# Patient Record
Sex: FEMALE | Race: WHITE | Employment: FULL TIME | URBAN - METROPOLITAN AREA
[De-identification: names, ages, dates, MRNs, and addresses within clinical notes are randomized per-mention and may not be internally consistent; named-entity substitution may affect disease eponyms.]

---

## 2021-04-27 ENCOUNTER — HOSPITAL ENCOUNTER (OUTPATIENT)
Dept: ULTRASOUND IMAGING | Age: 21
Discharge: HOME | End: 2021-04-27
Payer: COMMERCIAL

## 2021-04-27 DIAGNOSIS — R10.2 PELVIC PAIN IN FEMALE: ICD-10-CM

## 2021-04-27 PROCEDURE — 76830 TRANSVAGINAL US NON-OB: CPT

## 2021-04-27 PROCEDURE — 76856 US EXAM PELVIC COMPLETE: CPT

## 2021-04-28 ENCOUNTER — HOSPITAL ENCOUNTER (INPATIENT)
Age: 21
LOS: 1 days | Discharge: HOME | DRG: 690 | End: 2021-04-29
Attending: EMERGENCY MEDICINE | Admitting: INTERNAL MEDICINE
Payer: COMMERCIAL

## 2021-04-28 ENCOUNTER — APPOINTMENT (OUTPATIENT)
Dept: GENERAL RADIOLOGY | Age: 21
DRG: 690 | End: 2021-04-28
Payer: COMMERCIAL

## 2021-04-28 ENCOUNTER — APPOINTMENT (OUTPATIENT)
Dept: CT IMAGING | Age: 21
DRG: 690 | End: 2021-04-28
Payer: COMMERCIAL

## 2021-04-28 DIAGNOSIS — N12 PYELONEPHRITIS: Primary | ICD-10-CM

## 2021-04-28 PROBLEM — N10 ACUTE PYELONEPHRITIS: Status: ACTIVE | Noted: 2021-04-28

## 2021-04-28 LAB
ALBUMIN SERPL-MCNC: 3.7 G/DL (ref 3.4–5)
ALP BLD-CCNC: 74 U/L (ref 40–129)
ALT SERPL-CCNC: 6 U/L (ref 10–40)
ANION GAP SERPL CALCULATED.3IONS-SCNC: 13 MMOL/L (ref 3–16)
AST SERPL-CCNC: 15 U/L (ref 15–37)
BACTERIA WET PREP: NORMAL
BACTERIA: ABNORMAL /HPF
BASOPHILS ABSOLUTE: 0 K/UL (ref 0–0.2)
BASOPHILS RELATIVE PERCENT: 0 %
BILIRUB SERPL-MCNC: 0.6 MG/DL (ref 0–1)
BILIRUBIN DIRECT: <0.2 MG/DL (ref 0–0.3)
BILIRUBIN URINE: NEGATIVE
BILIRUBIN, INDIRECT: ABNORMAL MG/DL (ref 0–1)
BLOOD, URINE: ABNORMAL
BUN BLDV-MCNC: 8 MG/DL (ref 7–20)
CALCIUM SERPL-MCNC: 9 MG/DL (ref 8.3–10.6)
CHLORIDE BLD-SCNC: 100 MMOL/L (ref 99–110)
CLARITY: CLEAR
CLUE CELLS: NORMAL
CO2: 21 MMOL/L (ref 21–32)
COLOR: YELLOW
CREAT SERPL-MCNC: 0.7 MG/DL (ref 0.6–1.1)
EOSINOPHILS ABSOLUTE: 0 K/UL (ref 0–0.6)
EOSINOPHILS RELATIVE PERCENT: 0 %
EPITHELIAL CELLS WET PREP: NORMAL
EPITHELIAL CELLS, UA: ABNORMAL /HPF (ref 0–5)
GFR AFRICAN AMERICAN: >60
GFR NON-AFRICAN AMERICAN: >60
GLUCOSE BLD-MCNC: 149 MG/DL (ref 70–99)
GLUCOSE URINE: NEGATIVE MG/DL
HCG(URINE) PREGNANCY TEST: NEGATIVE
HCT VFR BLD CALC: 35.3 % (ref 36–48)
HEMOGLOBIN: 12 G/DL (ref 12–16)
KETONES, URINE: NEGATIVE MG/DL
LEUKOCYTE ESTERASE, URINE: ABNORMAL
LIPASE: 11 U/L (ref 13–60)
LYMPHOCYTES ABSOLUTE: 0.8 K/UL (ref 1–5.1)
LYMPHOCYTES RELATIVE PERCENT: 4 %
MAGNESIUM: 1.7 MG/DL (ref 1.8–2.4)
MCH RBC QN AUTO: 29.6 PG (ref 26–34)
MCHC RBC AUTO-ENTMCNC: 34.1 G/DL (ref 31–36)
MCV RBC AUTO: 87 FL (ref 80–100)
MICROSCOPIC EXAMINATION: YES
MONOCYTES ABSOLUTE: 0.9 K/UL (ref 0–1.3)
MONOCYTES RELATIVE PERCENT: 5 %
NEUTROPHILS ABSOLUTE: 17.2 K/UL (ref 1.7–7.7)
NEUTROPHILS RELATIVE PERCENT: 91 %
NITRITE, URINE: NEGATIVE
OVALOCYTES: ABNORMAL
PDW BLD-RTO: 14.9 % (ref 12.4–15.4)
PH UA: 6.5 (ref 5–8)
PLATELET # BLD: 238 K/UL (ref 135–450)
PMV BLD AUTO: 9.5 FL (ref 5–10.5)
POTASSIUM REFLEX MAGNESIUM: 3.3 MMOL/L (ref 3.5–5.1)
PROTEIN UA: ABNORMAL MG/DL
RAPID INFLUENZA  B AGN: NEGATIVE
RAPID INFLUENZA A AGN: NEGATIVE
RBC # BLD: 4.05 M/UL (ref 4–5.2)
RBC UA: ABNORMAL /HPF (ref 0–4)
RBC WET PREP: NORMAL
SODIUM BLD-SCNC: 134 MMOL/L (ref 136–145)
SOURCE WET PREP: NORMAL
SPECIFIC GRAVITY UA: <=1.005 (ref 1–1.03)
TOTAL PROTEIN: 7.3 G/DL (ref 6.4–8.2)
TRICHOMONAS PREP: NORMAL
URINE TYPE: ABNORMAL
UROBILINOGEN, URINE: 0.2 E.U./DL
WBC # BLD: 18.9 K/UL (ref 4–11)
WBC UA: ABNORMAL /HPF (ref 0–5)
WBC WET PREP: NORMAL
YEAST WET PREP: NORMAL

## 2021-04-28 PROCEDURE — 96361 HYDRATE IV INFUSION ADD-ON: CPT

## 2021-04-28 PROCEDURE — 87591 N.GONORRHOEAE DNA AMP PROB: CPT

## 2021-04-28 PROCEDURE — 6360000004 HC RX CONTRAST MEDICATION: Performed by: PHYSICIAN ASSISTANT

## 2021-04-28 PROCEDURE — 96375 TX/PRO/DX INJ NEW DRUG ADDON: CPT

## 2021-04-28 PROCEDURE — 81001 URINALYSIS AUTO W/SCOPE: CPT

## 2021-04-28 PROCEDURE — 6360000002 HC RX W HCPCS: Performed by: INTERNAL MEDICINE

## 2021-04-28 PROCEDURE — 99284 EMERGENCY DEPT VISIT MOD MDM: CPT

## 2021-04-28 PROCEDURE — 84703 CHORIONIC GONADOTROPIN ASSAY: CPT

## 2021-04-28 PROCEDURE — 87491 CHLMYD TRACH DNA AMP PROBE: CPT

## 2021-04-28 PROCEDURE — 2580000003 HC RX 258: Performed by: INTERNAL MEDICINE

## 2021-04-28 PROCEDURE — 6360000002 HC RX W HCPCS: Performed by: PHYSICIAN ASSISTANT

## 2021-04-28 PROCEDURE — 87086 URINE CULTURE/COLONY COUNT: CPT

## 2021-04-28 PROCEDURE — 36415 COLL VENOUS BLD VENIPUNCTURE: CPT

## 2021-04-28 PROCEDURE — 74177 CT ABD & PELVIS W/CONTRAST: CPT

## 2021-04-28 PROCEDURE — U0003 INFECTIOUS AGENT DETECTION BY NUCLEIC ACID (DNA OR RNA); SEVERE ACUTE RESPIRATORY SYNDROME CORONAVIRUS 2 (SARS-COV-2) (CORONAVIRUS DISEASE [COVID-19]), AMPLIFIED PROBE TECHNIQUE, MAKING USE OF HIGH THROUGHPUT TECHNOLOGIES AS DESCRIBED BY CMS-2020-01-R: HCPCS

## 2021-04-28 PROCEDURE — 2580000003 HC RX 258: Performed by: PHYSICIAN ASSISTANT

## 2021-04-28 PROCEDURE — 6370000000 HC RX 637 (ALT 250 FOR IP): Performed by: EMERGENCY MEDICINE

## 2021-04-28 PROCEDURE — 1200000000 HC SEMI PRIVATE

## 2021-04-28 PROCEDURE — 85025 COMPLETE CBC W/AUTO DIFF WBC: CPT

## 2021-04-28 PROCEDURE — 71045 X-RAY EXAM CHEST 1 VIEW: CPT

## 2021-04-28 PROCEDURE — 6370000000 HC RX 637 (ALT 250 FOR IP): Performed by: INTERNAL MEDICINE

## 2021-04-28 PROCEDURE — U0005 INFEC AGEN DETEC AMPLI PROBE: HCPCS

## 2021-04-28 PROCEDURE — 87804 INFLUENZA ASSAY W/OPTIC: CPT

## 2021-04-28 PROCEDURE — 87210 SMEAR WET MOUNT SALINE/INK: CPT

## 2021-04-28 PROCEDURE — 80048 BASIC METABOLIC PNL TOTAL CA: CPT

## 2021-04-28 PROCEDURE — 83690 ASSAY OF LIPASE: CPT

## 2021-04-28 PROCEDURE — 96365 THER/PROPH/DIAG IV INF INIT: CPT

## 2021-04-28 PROCEDURE — 83735 ASSAY OF MAGNESIUM: CPT

## 2021-04-28 PROCEDURE — 80076 HEPATIC FUNCTION PANEL: CPT

## 2021-04-28 RX ORDER — METRONIDAZOLE 500 MG/1
500 TABLET ORAL 2 TIMES DAILY
Status: ON HOLD | COMMUNITY
End: 2021-04-28

## 2021-04-28 RX ORDER — SODIUM CHLORIDE 0.9 % (FLUSH) 0.9 %
5-40 SYRINGE (ML) INJECTION EVERY 12 HOURS SCHEDULED
Status: DISCONTINUED | OUTPATIENT
Start: 2021-04-28 | End: 2021-04-29 | Stop reason: HOSPADM

## 2021-04-28 RX ORDER — SODIUM CHLORIDE 9 MG/ML
25 INJECTION, SOLUTION INTRAVENOUS PRN
Status: DISCONTINUED | OUTPATIENT
Start: 2021-04-28 | End: 2021-04-29 | Stop reason: HOSPADM

## 2021-04-28 RX ORDER — ACETAMINOPHEN 650 MG/1
650 SUPPOSITORY RECTAL EVERY 6 HOURS PRN
Status: DISCONTINUED | OUTPATIENT
Start: 2021-04-28 | End: 2021-04-29 | Stop reason: HOSPADM

## 2021-04-28 RX ORDER — SODIUM CHLORIDE 0.9 % (FLUSH) 0.9 %
5-40 SYRINGE (ML) INJECTION PRN
Status: DISCONTINUED | OUTPATIENT
Start: 2021-04-28 | End: 2021-04-29 | Stop reason: HOSPADM

## 2021-04-28 RX ORDER — SODIUM CHLORIDE 9 MG/ML
INJECTION, SOLUTION INTRAVENOUS CONTINUOUS
Status: DISCONTINUED | OUTPATIENT
Start: 2021-04-28 | End: 2021-04-29 | Stop reason: HOSPADM

## 2021-04-28 RX ORDER — 0.9 % SODIUM CHLORIDE 0.9 %
1000 INTRAVENOUS SOLUTION INTRAVENOUS ONCE
Status: COMPLETED | OUTPATIENT
Start: 2021-04-28 | End: 2021-04-28

## 2021-04-28 RX ORDER — ACETAMINOPHEN 325 MG/1
650 TABLET ORAL EVERY 6 HOURS PRN
Status: DISCONTINUED | OUTPATIENT
Start: 2021-04-28 | End: 2021-04-29 | Stop reason: HOSPADM

## 2021-04-28 RX ORDER — POTASSIUM CHLORIDE 7.45 MG/ML
10 INJECTION INTRAVENOUS PRN
Status: DISCONTINUED | OUTPATIENT
Start: 2021-04-28 | End: 2021-04-29 | Stop reason: HOSPADM

## 2021-04-28 RX ORDER — METRONIDAZOLE 500 MG/1
500 TABLET ORAL 2 TIMES DAILY
Status: ON HOLD | COMMUNITY
End: 2021-04-29 | Stop reason: HOSPADM

## 2021-04-28 RX ORDER — POLYETHYLENE GLYCOL 3350 17 G/17G
17 POWDER, FOR SOLUTION ORAL DAILY PRN
Status: DISCONTINUED | OUTPATIENT
Start: 2021-04-28 | End: 2021-04-29 | Stop reason: HOSPADM

## 2021-04-28 RX ORDER — ACETAMINOPHEN 500 MG
500 TABLET ORAL ONCE
Status: COMPLETED | OUTPATIENT
Start: 2021-04-28 | End: 2021-04-28

## 2021-04-28 RX ORDER — DOXYCYCLINE HYCLATE 100 MG/1
100 CAPSULE ORAL 2 TIMES DAILY
Status: ON HOLD | COMMUNITY
End: 2021-04-29 | Stop reason: SDUPTHER

## 2021-04-28 RX ORDER — DOXYCYCLINE 100 MG/1
100 CAPSULE ORAL EVERY 12 HOURS SCHEDULED
Status: DISCONTINUED | OUTPATIENT
Start: 2021-04-28 | End: 2021-04-29 | Stop reason: HOSPADM

## 2021-04-28 RX ORDER — DOXYCYCLINE HYCLATE 100 MG/1
100 CAPSULE ORAL DAILY
Status: ON HOLD | COMMUNITY
End: 2021-04-28

## 2021-04-28 RX ORDER — ONDANSETRON 2 MG/ML
4 INJECTION INTRAMUSCULAR; INTRAVENOUS EVERY 6 HOURS PRN
Status: DISCONTINUED | OUTPATIENT
Start: 2021-04-28 | End: 2021-04-29 | Stop reason: HOSPADM

## 2021-04-28 RX ORDER — POTASSIUM CHLORIDE 20 MEQ/1
40 TABLET, EXTENDED RELEASE ORAL PRN
Status: DISCONTINUED | OUTPATIENT
Start: 2021-04-28 | End: 2021-04-29 | Stop reason: HOSPADM

## 2021-04-28 RX ORDER — PROMETHAZINE HYDROCHLORIDE 25 MG/1
12.5 TABLET ORAL EVERY 6 HOURS PRN
Status: DISCONTINUED | OUTPATIENT
Start: 2021-04-28 | End: 2021-04-29 | Stop reason: HOSPADM

## 2021-04-28 RX ORDER — MAGNESIUM SULFATE 1 G/100ML
1000 INJECTION INTRAVENOUS PRN
Status: DISCONTINUED | OUTPATIENT
Start: 2021-04-28 | End: 2021-04-29 | Stop reason: HOSPADM

## 2021-04-28 RX ADMIN — SODIUM CHLORIDE 1000 ML: 0.9 INJECTION, SOLUTION INTRAVENOUS at 11:19

## 2021-04-28 RX ADMIN — SODIUM CHLORIDE: 9 INJECTION, SOLUTION INTRAVENOUS at 22:37

## 2021-04-28 RX ADMIN — DOXYCYCLINE 100 MG: 100 CAPSULE ORAL at 22:26

## 2021-04-28 RX ADMIN — IOPAMIDOL 80 ML: 755 INJECTION, SOLUTION INTRAVENOUS at 12:55

## 2021-04-28 RX ADMIN — ACETAMINOPHEN 500 MG: 500 TABLET, COATED ORAL at 18:02

## 2021-04-28 RX ADMIN — POTASSIUM CHLORIDE 40 MEQ: 1500 TABLET, EXTENDED RELEASE ORAL at 22:26

## 2021-04-28 RX ADMIN — IOHEXOL 50 ML: 240 INJECTION, SOLUTION INTRATHECAL; INTRAVASCULAR; INTRAVENOUS; ORAL at 12:55

## 2021-04-28 RX ADMIN — DEXTROSE MONOHYDRATE 1000 MG: 5 INJECTION INTRAVENOUS at 15:37

## 2021-04-28 RX ADMIN — ONDANSETRON 4 MG: 2 INJECTION INTRAMUSCULAR; INTRAVENOUS at 18:34

## 2021-04-28 RX ADMIN — Medication 10 ML: at 22:27

## 2021-04-28 ASSESSMENT — ENCOUNTER SYMPTOMS
SHORTNESS OF BREATH: 0
VOMITING: 0
ABDOMINAL PAIN: 1
NAUSEA: 0
CONSTIPATION: 0
CHEST TIGHTNESS: 0
BACK PAIN: 0
DIARRHEA: 0

## 2021-04-28 ASSESSMENT — PAIN SCALES - GENERAL: PAINLEVEL_OUTOF10: 8

## 2021-04-28 NOTE — ED PROVIDER NOTES
ED Attending Attestation Note     Date of evaluation: 4/28/2021    This patient was seen by the advance practice provider. I have seen and examined the patient, agree with the workup, evaluation, management and diagnosis. The care plan has been discussed. My assessment reveals a young female who presents with ongoing abdominal pain. Has been present for the past week or so. Does not keep her up at night or interfere with her daily activities. Has also noticed some irregular menstrual spotting which is atypical for her she had not had any bleeding since her IUD was put in place in November and she stopped getting cycles in January. Abdomen is soft without any significant tenderness.      Kia Dumont MD  04/28/21 8628

## 2021-04-28 NOTE — ED PROVIDER NOTES
[address] [facility] [address] ENCOUNTER          PHYSICIAN ASSISTANT NOTE       Date of evaluation: 4/28/2021    Chief Complaint     Fever (102 at home, pt is concerned for infection of her IUD), Pelvic Pain, Back Pain, and Abdominal Pain    History of Present Illness     Volney Nageotte is a 24 y.o. female with no known past medical history presenting to the emergency department for evaluation of abdominal pain, flank pain and fever. Symptoms started about 4 days ago with lower abdominal pain and right-sided flank pain. She has since progressed to have bilateral flank pain. Over the last 2 days she has had a fever, this morning around 7 was 102 °F.  No associated nausea or vomiting. She was seen yesterday at urgent care and was diagnosed with possible PID, I discussed this with urgent care and they did not perform a pelvic exam, but did start her on empiric treatment with Rocephin, doxycycline and Flagyl. Patient denies any abnormal vaginal discharge. She attempted to call her gynecologist to schedule a follow-up appointment but they will not see her because she has had a fever. She has had the [facility] vaccine, does report a headache with no chest pain or shortness of breath, no recent cough or nasal congestion. Prior to coming to the emergency department she took ibuprofen with minimal improvement in pain, and currently she is afebrile. She denies any dysuria, hematuria or increased urinary frequency. Review of Systems     Review of Systems   Constitutional: Positive for fever. Negative for chills, diaphoresis and fatigue. HENT: Negative for congestion. Respiratory: Negative for chest tightness and shortness of breath. Cardiovascular: Negative for chest pain. Gastrointestinal: Positive for abdominal pain. Negative for constipation, diarrhea, nausea and vomiting. Genitourinary: Negative for dysuria, frequency, hematuria, urgency, vaginal discharge and vaginal pain. Musculoskeletal: Negative for back pain and neck pain. Neurological: Positive for headaches. Negative for dizziness, seizures, weakness and numbness. Psychiatric/Behavioral: Negative for confusion. Past Medical, Surgical, Family, and Social History     She has no past medical history on file. She has no past surgical history on file. Her family history is not on file. She reports that she has never smoked. She has never used smokeless tobacco. She reports current alcohol use. She reports that she does not use drugs. Medications     Previous Medications    DOXYCYCLINE HYCLATE (VIBRAMYCIN) 100 MG CAPSULE    Take 100 mg by mouth daily    METRONIDAZOLE (FLAGYL) 500 MG TABLET    Take 500 mg by mouth 2 times daily       Allergies     She is allergic to pcn [penicillins]. Physical Exam     INITIAL VITALS: BP: 120/79, Temp: 98.5 °F (36.9 °C), Heart Rate: (!) 108, Resp: 16, SpO2: 100 %  Physical Exam  Vitals signs and nursing note reviewed. Constitutional:       General: She is not in acute distress. Appearance: She is well-developed and normal weight. She is not ill-appearing, toxic-appearing or diaphoretic. HENT:      Head: Normocephalic. Cardiovascular:      Rate and Rhythm: Normal rate and regular rhythm. Pulmonary:      Effort: Pulmonary effort is normal. No respiratory distress. Breath sounds: Normal breath sounds. No stridor. No wheezing or rhonchi. Abdominal:      Tenderness: Negative signs include Richter's sign, Rovsing's sign, McBurney's sign, psoas sign and obturator sign.       Comments: Abdomen soft, tenderness present in the suprapubic and left lower quadrant region of the abdomen, bilateral flank/CVA tenderness, no rebound or guarding   Genitourinary:     Comments: : External genitalia without lesions or masses,  Pink, frothy, moderate amount of vaginal discharge in the vaginal vault, no cervical motion tenderness, cervix appears normal with no friability, unable to visualize strings of IUD  No adnexal tenderness or masses palpated. Uterus is of normal size without tenderness to palpation. Skin:     General: Skin is warm and dry. Capillary Refill: Capillary refill takes less than 2 seconds. Neurological:      Mental Status: She is alert. Psychiatric:         Behavior: Behavior normal.       Diagnostic Results     RADIOLOGY:  CT ABDOMEN PELVIS W IV CONTRAST Additional Contrast? Oral   Final Result      1. Decreased enhancement, decreased density, of the left renal upper pole. Findings would be most suggestive of pyelonephritis. Clinical correlation is necessary. Renal infarct could also be possible etiology. Underlying mass lesion considered less    likely but not entirely excluded.          XR CHEST PORTABLE    (Results Pending)     LABS:   Results for orders placed or performed during the hospital encounter of 04/28/21   Wet prep, genital    Specimen: Vaginal   Result Value Ref Range    Trichomonas Prep None Seen     Yeast, Wet Prep None Seen     Clue Cells, Wet Prep None Seen     WBC, Wet Prep 2+     RBC, Wet Prep None Seen     Epi Cells 4+     Bacteria 1+     Source Wet Prep Vaginal    Urinalysis, reflex to microscopic   Result Value Ref Range    Color, UA Yellow Straw/Yellow    Clarity, UA Clear Clear    Glucose, Ur Negative Negative mg/dL    Bilirubin Urine Negative Negative    Ketones, Urine Negative Negative mg/dL    Specific Gravity, UA <=1.005 1.005 - 1.030    Blood, Urine SMALL (A) Negative    pH, UA 6.5 5.0 - 8.0    Protein, UA TRACE (A) Negative mg/dL    Urobilinogen, Urine 0.2 <2.0 E.U./dL    Nitrite, Urine Negative Negative    Leukocyte Esterase, Urine TRACE (A) Negative    Microscopic Examination YES     Urine Type NotGiven    Pregnancy, Urine   Result Value Ref Range    HCG(Urine) Pregnancy Test Negative Detects HCG level >20 MIU/mL   CBC Auto Differential   Result Value Ref Range    WBC 18.9 (H) 4.0 - 11.0 K/uL    RBC 4.05 4.00 - 5.20 M/uL Hemoglobin 12.0 12.0 - 16.0 g/dL    Hematocrit 35.3 (L) 36.0 - 48.0 %    MCV 87.0 80.0 - 100.0 fL    MCH 29.6 26.0 - 34.0 pg    MCHC 34.1 31.0 - 36.0 g/dL    RDW 14.9 12.4 - 15.4 %    Platelets 491 292 - 549 K/uL    MPV 9.5 5.0 - 10.5 fL    Neutrophils % 91.0 %    Lymphocytes % 4.0 %    Monocytes % 5.0 %    Eosinophils % 0.0 %    Basophils % 0.0 %    Neutrophils Absolute 17.2 (H) 1.7 - 7.7 K/uL    Lymphocytes Absolute 0.8 (L) 1.0 - 5.1 K/uL    Monocytes Absolute 0.9 0.0 - 1.3 K/uL    Eosinophils Absolute 0.0 0.0 - 0.6 K/uL    Basophils Absolute 0.0 0.0 - 0.2 K/uL    Ovalocytes Occasional (A)    Basic Metabolic Panel w/ Reflex to MG   Result Value Ref Range    Sodium 134 (L) 136 - 145 mmol/L    Potassium reflex Magnesium 3.3 (L) 3.5 - 5.1 mmol/L    Chloride 100 99 - 110 mmol/L    CO2 21 21 - 32 mmol/L    Anion Gap 13 3 - 16    Glucose 149 (H) 70 - 99 mg/dL    BUN 8 7 - 20 mg/dL    CREATININE 0.7 0.6 - 1.1 mg/dL    GFR Non-African American >60 >60    GFR African American >60 >60    Calcium 9.0 8.3 - 10.6 mg/dL   Hepatic Function Panel   Result Value Ref Range    Total Protein 7.3 6.4 - 8.2 g/dL    Albumin 3.7 3.4 - 5.0 g/dL    Alkaline Phosphatase 74 40 - 129 U/L    ALT 6 (L) 10 - 40 U/L    AST 15 15 - 37 U/L    Total Bilirubin 0.6 0.0 - 1.0 mg/dL    Bilirubin, Direct <0.2 0.0 - 0.3 mg/dL    Bilirubin, Indirect see below 0.0 - 1.0 mg/dL   Lipase   Result Value Ref Range    Lipase 11.0 (L) 13.0 - 60.0 U/L   Microscopic Urinalysis   Result Value Ref Range    WBC, UA 3-5 0 - 5 /HPF    RBC, UA 0-2 0 - 4 /HPF    Epithelial Cells, UA 21-50 (A) 0 - 5 /HPF    Bacteria, UA Rare (A) None Seen /HPF   Magnesium   Result Value Ref Range    Magnesium 1.70 (L) 1.80 - 2.40 mg/dL       ED BEDSIDE ULTRASOUND:  None    RECENT VITALS:  BP: 120/79, Temp: 98.5 °F (36.9 °C), Heart Rate: (!) 108, Resp: 16, SpO2: 100 %     Procedures   None    ED Course     Nursing Notes, Past Medical Hx,Past Surgical Hx, Social Hx, Allergies, and Family Hx were reviewed. The patient was given the following medications:  Orders Placed This Encounter   Medications    0.9 % sodium chloride bolus    iohexol (OMNIPAQUE 240) injection 50 mL    iopamidol (ISOVUE-370) 76 % injection 80 mL    cefTRIAXone (ROCEPHIN) 1000 mg IVPB in 50 mL D5W minibag     Order Specific Question:   Antimicrobial Indications     Answer:   Urinary Tract Infection    doxycycline monohydrate (MONODOX) capsule 100 mg     Order Specific Question:   Antimicrobial Indications     Answer:   STD infection       CONSULTS:  IP CONSULT TO UROLOGY  IP CONSULT TO INTERVENTIONAL RADIOLOGY  IP CONSULT TO HOSPITALIST    MEDICAL DECISION MAKING / ASSESSMENT / Danielle Odalys is a 24 y.o. female sending to the emergency department for evaluation of fever, lower abdominal pain, flank pain, currently being treated with antibiotics for possible PID. She was seen yesterday at urgent care and was empirically started on Rocephin, doxycycline and Flagyl. She reports fever of 102 over the last 2 days, upon presentation she was afebrile after taking ibuprofen prior to coming to the ED, she was slightly tachycardic, normotensive, and nontoxic in appearance. Patient does have IUD that was placed in November with no complications. She has had slightly abnormal menstrual cycles recently. On exam she has suprapubic tenderness with left-sided CVA tenderness. Pelvic exam completed with mild to moderate amount of thick white, pink vaginal discharge, no cervical motion tenderness. Unable to visualize strings of IUD. Gonorrhea and Chlamydia culture sent, results are pending. Wet prep was negative for trichomonas, yeast infection or bacterial vaginosis. Urinalysis showed no evidence of urinary tract infection with trace leukocytes, 3-5 white blood cells and 21-50 epithelial cells.   She was found to have a leukocytosis of 18.9, for this reason with CVA tenderness and reported fever of 102, CT scan completed for further evaluation. CT scan was concerning for decreased enhancement and density of the left upper renal pole suspicious of pyelonephritis with differential to include renal infarct or mass lesion. Kidney function was normal.  Discussed with urology who deferred to interventional radiology. His symptoms of flank pain have been ongoing for greater than 10 hours she is not a candidate for any acute intervention with low suspicion for renal infarct. Interventional radiology deferred back to urology, who agreed with plan for IV antibiotics and further monitoring of likely pyelonephritis. No evidence of obstructive nephrolithiasis in addition to CT findings. No hydronephrosis seen. She was started on Rocephin, urine culture sent. We will continue doxycycline which was started yesterday by urgent care for concerns of STD until results of gonorrhea and Chlamydia cultures return. Patient is in agreement with plan for period of observation. Vitals remained stable after receiving 1 L of IV fluids, tachycardia resolved. Urology will follow along with care. This patient was also evaluated by the attending physician. All care plans were discussed and agreed upon. Clinical Impression     1. Pyelonephritis        Disposition     PATIENT REFERRED TO:  No follow-up provider specified.     DISCHARGE MEDICATIONS:  New Prescriptions    No medications on file       DISPOSITION Decision To Admit 04/28/2021 03:38:59 PM        Ellen Caballero PA-C  04/28/21 4888

## 2021-04-28 NOTE — LETTER
[facility]  [address]  Phone: [phone]    No name on file. April 29, 2021     Patient: Annika Sood   YOB: 2000   Date of Visit: 4/28/2021       To Whom it May Concern:    Annika Sood was seen in at the hospital 4/28/2021 and 4/29/2021. If you have any questions or concerns, please don't hesitate to call.     Sincerely,         Electronically signed by Cathy Manuel RN on 4/29/2021 at 12:19 PM

## 2021-04-29 VITALS
HEART RATE: 99 BPM | TEMPERATURE: 98.8 F | DIASTOLIC BLOOD PRESSURE: 79 MMHG | WEIGHT: 120 LBS | OXYGEN SATURATION: 98 % | SYSTOLIC BLOOD PRESSURE: 130 MMHG | RESPIRATION RATE: 17 BRPM | BODY MASS INDEX: 18.83 KG/M2 | HEIGHT: 67 IN

## 2021-04-29 LAB
ANION GAP SERPL CALCULATED.3IONS-SCNC: 11 MMOL/L (ref 3–16)
BUN BLDV-MCNC: 5 MG/DL (ref 7–20)
C TRACH DNA GENITAL QL NAA+PROBE: NEGATIVE
CALCIUM SERPL-MCNC: 8.4 MG/DL (ref 8.3–10.6)
CHLORIDE BLD-SCNC: 106 MMOL/L (ref 99–110)
CO2: 19 MMOL/L (ref 21–32)
CREAT SERPL-MCNC: 0.6 MG/DL (ref 0.6–1.1)
GFR AFRICAN AMERICAN: >60
GFR NON-AFRICAN AMERICAN: >60
GLUCOSE BLD-MCNC: 103 MG/DL (ref 70–99)
HCT VFR BLD CALC: 32 % (ref 36–48)
HEMOGLOBIN: 10.7 G/DL (ref 12–16)
MCH RBC QN AUTO: 29.4 PG (ref 26–34)
MCHC RBC AUTO-ENTMCNC: 33.3 G/DL (ref 31–36)
MCV RBC AUTO: 88.5 FL (ref 80–100)
N. GONORRHOEAE DNA: NEGATIVE
PDW BLD-RTO: 14.9 % (ref 12.4–15.4)
PLATELET # BLD: 212 K/UL (ref 135–450)
PMV BLD AUTO: 9.5 FL (ref 5–10.5)
POTASSIUM REFLEX MAGNESIUM: 3.9 MMOL/L (ref 3.5–5.1)
RBC # BLD: 3.62 M/UL (ref 4–5.2)
SARS-COV-2: NOT DETECTED
SODIUM BLD-SCNC: 136 MMOL/L (ref 136–145)
URINE CULTURE, ROUTINE: NORMAL
WBC # BLD: 12.7 K/UL (ref 4–11)

## 2021-04-29 PROCEDURE — 2580000003 HC RX 258: Performed by: INTERNAL MEDICINE

## 2021-04-29 PROCEDURE — 36415 COLL VENOUS BLD VENIPUNCTURE: CPT

## 2021-04-29 PROCEDURE — 6360000002 HC RX W HCPCS: Performed by: INTERNAL MEDICINE

## 2021-04-29 PROCEDURE — 85027 COMPLETE CBC AUTOMATED: CPT

## 2021-04-29 PROCEDURE — 80048 BASIC METABOLIC PNL TOTAL CA: CPT

## 2021-04-29 PROCEDURE — 6370000000 HC RX 637 (ALT 250 FOR IP): Performed by: INTERNAL MEDICINE

## 2021-04-29 RX ORDER — CIPROFLOXACIN 500 MG/1
500 TABLET, FILM COATED ORAL 2 TIMES DAILY
Qty: 20 TABLET | Refills: 0 | Status: SHIPPED | OUTPATIENT
Start: 2021-04-29 | End: 2021-05-09

## 2021-04-29 RX ORDER — DOXYCYCLINE HYCLATE 100 MG/1
100 CAPSULE ORAL 2 TIMES DAILY
Qty: 20 CAPSULE | Refills: 0 | Status: SHIPPED | OUTPATIENT
Start: 2021-04-29 | End: 2021-05-09

## 2021-04-29 RX ADMIN — SODIUM CHLORIDE: 9 INJECTION, SOLUTION INTRAVENOUS at 08:25

## 2021-04-29 RX ADMIN — ENOXAPARIN SODIUM 40 MG: 40 INJECTION SUBCUTANEOUS at 09:33

## 2021-04-29 RX ADMIN — DOXYCYCLINE 100 MG: 100 CAPSULE ORAL at 09:33

## 2021-04-29 NOTE — PLAN OF CARE
Problem: Pain Control  Goal: Maintain pain level at or below patient's acceptable level (or 5 if patient is unable to determine acceptable level)  Outcome: Ongoing  Note: Patient denied pain overnight and appeared to rest comfortably in bed. Will continue to monitor. Amber [facility] 4/29/2021      Problem: Cardiovascular  Goal: Hemodynamic stability  Outcome: Ongoing  Note: Patient vitals and O2 saturation stable in room air overnight. Will continue to monitor.     Amber [facility] 4/29/2021

## 2021-04-29 NOTE — PROGRESS NOTES
4 Eyes Admission Assessment     I agree as the admission nurse that 2 RN's have performed a thorough Head to Toe Skin Assessment on the patient. ALL assessment sites listed below have been assessed on admission. Areas assessed by both nurses:   [x]   Head, Face, and Ears   [x]   Shoulders, Back, and Chest  [x]   Arms, Elbows, and Hands   [x]   Coccyx, Sacrum, and Ischium  [x]   Legs, Feet, and Heels        Does the Patient have Skin Breakdown? No, only visible areas inspected. Full head to toe inspection refused by patient, denies wounds.         Marvin Prevention initiated:  n/a  Wound Care Orders initiated:  n/a  WOC nurse consulted for Pressure Injury (Stage 3,4, Unstageable, DTI, NWPT, and Complex wounds) or Marvin score 18 or lower:  n/a    Nurse 1 eSignature: Electronically signed by Kishan Lo RN on 4/28/21 at 11:40 PM EDT    **SHARE this note so that the co-signing nurse is able to place an eSignature**    Nurse 2 eSignature: Electronically signed by Freida Ferro RN on 4/29/21 at 2:54 AM EDT

## 2021-04-29 NOTE — PLAN OF CARE
Problem: Pain Control  Goal: Maintain pain level at or below patient's acceptable level (or 5 if patient is unable to determine acceptable level)  4/29/2021 1330 by Ezekiel Kaur RN  Outcome: Completed   No pain reported by patient this shift  Problem:   Goal: Adequate urinary output  Outcome: Completed     Problem:   Goal: No urinary complication  Outcome: Completed   No issues urinating this shift

## 2021-04-29 NOTE — ED NOTES
Report called to RN  On 4S. Pt will be transported to room 4461.       Carmen Alonso RN  04/28/21 [id number]

## 2021-04-29 NOTE — CONSULTS
Urology Attending Consult Note      Reason for Consultation: Pyelo vs renal infarct    History: 23 yo F with no prior  history [facility] with 4 day history of fever, abdominal pain and flank pain. She saw urgent care prior who prescribed her rocephin, doxycycline and flagyl. CT scan performed showed probable pyelonephritis with possibility of renal infarct. UA small blood, trace leuks. Culture NGTD. WBC 18.9 on admission, patient afebrile. She was placed on Rocephin and doxycyline in the ED. Family History, Social History, Review of Systems:  Reviewed and agreed to as per chart    Vitals:  /75   Pulse 91   Temp 98.5 °F (36.9 °C) (Oral)   Resp 18   Ht 5' 7\" (1.702 m)   Wt 120 lb (54.4 kg)   SpO2 96%   BMI 18.79 kg/m²   Temp  Av.4 °F (37.4 °C)  Min: 98.5 °F (36.9 °C)  Max: 100.5 °F (38.1 °C)    Intake/Output Summary (Last 24 hours) at 2021 1050  Last data filed at 2021 0226  Gross per 24 hour   Intake 610 ml   Output --   Net 610 ml         Physical:   Well developed, well nourished in no acute distress   Mood indicates no abnormalities. Pt doesnt appear depressed   Orientated to time and place   Neck is supple, trachea is midline   Respiratory effort is normal   Cardiovascular show no extremity swelling   Abdomen no masses or hernias are palpated, there is no tenderness. Liver and Spleen appear normal.   Skin show no abnormal lesions   Lymph nodes are not palpated in the inguinal, neck, or axillary area.     Female :    Voiding clear urine      Labs:  WBC:    Lab Results   Component Value Date    WBC 12.7 2021     Hemoglobin/Hematocrit:    Lab Results   Component Value Date    HGB 10.7 2021    HCT 32.0 2021     BMP:    Lab Results   Component Value Date     2021    K 3.9 2021     2021    CO2 19 2021    BUN 5 2021    LABALBU 3.7 2021    CREATININE 0.6 2021 CALCIUM 8.4 04/29/2021    GFRAA >60 04/29/2021    LABGLOM >60 04/29/2021     PT/INR:  No results found for: PROTIME, INR  PTT:  No results found for: APTT[APTT    Urine Culture: NGTD    Antibiotic Therapy:  Rocephin, doxycycline    Imaging: CT ABD PELVIS  Impression       1. Decreased enhancement, decreased density, of the left renal upper pole. Findings would be most suggestive of pyelonephritis. Clinical correlation is necessary. Renal infarct could also be possible etiology. Underlying mass lesion considered less    likely but not entirely excluded.         Impression/Plan: 25 yo F admitted to [facility] with fevers, abdominal pain and flank pain. CT scan showed evidence of pyelonephritis with possible renal infarct.  -Patient feeling much better this AM. Reports no flank pain or issues with urination  -Ucx NGTD  -WBC downtrending to 12.7 from 18.9, patient afebrile  -Discussed with MD. Renal infarct very unlikely given scan results. Most likely a pyelonephritis. She is ok to DC given that she is feeling better and clinically improved with oral abx. She can follow up with us in 2-3 months with prior renal imaging.     SENG [place]

## 2021-04-30 ENCOUNTER — CARE COORDINATION (OUTPATIENT)
Dept: CASE MANAGEMENT | Age: 21
End: 2021-04-30

## 2021-04-30 NOTE — CARE COORDINATION
Date/Time:  4/30/2021 9:10 AM  Attempted to reach patient by telephone. Call within 2 business days of discharge: Yes, Left HIPPA compliant message requesting a return call. Will attempt to reach patient again.     Thank John Andrea RN  Care Transition Coordinator  Contact [facility]:[phone]

## 2021-04-30 NOTE — CARE COORDINATION
Patient contacted regarding Quintin Haynes. Care Transition Nurse contacted the patient by telephone to perform post discharge assessment. Call within 2 business days of discharge: Yes. Verified name and  with patient as identifiers. Provided introduction to self, and explanation of the CTN/ACM role, and reason for call due to risk factors for infection and/or exposure to COVID-19. Symptoms reviewed with patient who verbalized the following symptoms: flank pain. Due to no new or worsening symptoms encounter was not routed to provider for escalation. Discussed follow-up appointments. If no appointment was previously scheduled, appointment scheduling offered: Yes  [facility] follow up appointment(s): No future appointments. Non-face-to-face services provided:  Obtained and reviewed discharge summary and/or continuity of care documents  Education of patient/family/caregiver/guardian to support self-management-. Assessment and support for treatment adherence and medication management-. Advance Care Planning:   Does patient have an Advance Directive:  not on file. Patient has following risk factors of: no known risk factors. CTN reviewed discharge instructions, medical action plan and red flags such as increased shortness of breath, increasing fever and signs of decompensation with patient who verbalized understanding. Discussed exposure protocols and quarantine with CDC Guidelines What to do if you are sick with coronavirus disease .  Patient was given an opportunity for questions and concerns. The patient agrees to contact the [facility] exposure line [phone], [address] of [facility]: ([phone]) and PCP office for questions related to their healthcare. CTN provided contact information for future needs. Reviewed and educated patient on any new and changed medications related to discharge diagnosis     Was patient discharged with a pulse oximeter?  No Discussed and confirmed pulse oximeter discharge instructions and when to notify provider or seek emergency care. CTN spoke with patient this afternoon for follow up CTN call. Patient states she is doing well, had some fevers yesterday, none today. No reports of any nausea, vomiting, chills, SOB or cough. Patient does complain of some flank pain, states it is tolerable, instructed to take OTC Cranberry supplement for urinary tract pain. Patient with no other issues or concerns, no new or changed medications at this time. CTN encouraged patient to continue to monitor for any of the above s/s, reporting any that may present to MD immediately. Plan for follow up call in 7-14 days based on severity of symptoms and risk factors.     Thank Chrystal Acuña RN  Care Transition Coordinator  Contact [facility]:[phone]

## 2021-05-02 NOTE — DISCHARGE SUMMARY
Hospital Discharge Summary    Patient's PCP: No primary care provider on file. Admit Date: 4/28/2021   Discharge Date: 4/29/21    Admitting Physician: Dr. Zeenat Wang MD  Discharge Physician: Dr. Wolf [facility]:   SHARIF Chaidez TO HOSPITALIST    Brief HPI: The patient is a 24 y.o. female who presents to [facility] ([facility])  With lower abdomen pain, left flank pain for last 4 days. Associated with fever for last two days. She was in urgent care yesterday for similar complaints. sepeter was diagnosed with PID and was d/c after ceftriaxone, doxy anf flagyl. She continues to feel uncomfortable  And presented here today. Denying nay vaginal discharge, no urinary symptoms. She got IUD placed in November , since then she has intermittent spotting. CT done here with left pyelonephritis , suspicion for  Renal infarct. ER discussed with Radiology, interventional radiology and urology. Per IR Less likely infarct. Brief hospital course:     1. Acute pyelonephritis:-  Treated with  iv abx, iv fluids  Urine cx without growth, she was on abx, PTA  Seen by urology,   Per urology notes:-  Discussed with MD. Renal infarct very unlikely given scan results. Most likely a pyelonephritis. She is ok to DC given that she is feeling better and clinically improved with oral abx. She can follow up with us in 2-3 months with prior renal imaging.     Hx of allergies  Continue home meds      Hypokalemia/ hypomagnesemia  Replace as needed     She received J and J vaccine  On 4/6    Invasive procedures:  None     Discharge Diagnoses: Active Problems:    Acute pyelonephritis  Resolved Problems:    * No resolved hospital problems. *      Physical Exam: /79   Pulse 99   Temp 98.8 °F (37.1 °C) (Oral)   Resp 17   Ht 5' 7\" (1.702 m)   Wt 120 lb (54.4 kg)   SpO2 98%   BMI 18.79 kg/m²   Gen/overall appearance: Not in acute distress. Alert.   Head: Normocephalic, atraumatic  Eyes: EOMI, good acuity  ENT:- Oral mucosa moist  Neck: No JVD, thyromegaly  CVS: Nml S1S2, no MRG, RRR  Pulm: Clear bilaterally. No crackles/wheezes  Gastrointestinal: Soft, NT/ND, +BS  Musculoskeletal: No edema. Warm  Neuro: No focal deficit. Moves extremity spontaneously. Psychiatry: Appropriate affect. Not agitated. Skin: Warm, dry with normal turgor. No rash        Significant diagnostic studies that may require follow up:  Us Non Ob Transvaginal    Result Date: 4/27/2021  PELVIC ULTRASOUND Transabdominal trans -complete vaginal scanning INDICATIONS: Pelvic pain Comparison studies: None FINDINGS: Uterus measures 6.1 x 4.1 x 2.5 cm Myometrial echogenicity is normal Myometrial echogenicity is normal. IUD is present Endometrial stripe measures 3 mm. Cervix: Normal Cul-de-sac is devoid of free fluid Right ovary measures 2.8 x 2.3 x 1.6 cm Left ovary measures 2.1 x 2.1 x 1.7 cm Normal follicles bilaterally No adnexal masses     1. IUD in satisfactory position. 2. Otherwise normal.    Us Pelvis Complete    Result Date: 4/27/2021  PELVIC ULTRASOUND Transabdominal trans -complete vaginal scanning INDICATIONS: Pelvic pain Comparison studies: None FINDINGS: Uterus measures 6.1 x 4.1 x 2.5 cm Myometrial echogenicity is normal Myometrial echogenicity is normal. IUD is present Endometrial stripe measures 3 mm. Cervix: Normal Cul-de-sac is devoid of free fluid Right ovary measures 2.8 x 2.3 x 1.6 cm Left ovary measures 2.1 x 2.1 x 1.7 cm Normal follicles bilaterally No adnexal masses     1. IUD in satisfactory position. 2. Otherwise normal.    Ct Abdomen Pelvis W Iv Contrast Additional Contrast? Oral    Result Date: 4/28/2021  DATE: 4/28/2021 EXAM: CT ABDOMEN PELVIS W IV CONTRAST INDICATION: abd pain, leukocytosis 18.9, Pain COMPARISON: Selected images ultrasound April 27 TECHNIQUE: Axial CT imaging obtained from lung bases through pelvis.  Axial images and multiplanar reformatted images are provided for review. Individualized dose optimization technique was used in order to meet ALARA standards for radiation dose reduction. In addition to vendor specific dose reduction algorithms, the dose reduction techniques vary based on the specific scanner utilized but frequently include automated exposure control, adjustment of the mA and/or kV according to patient size, and use of iterative reconstruction technique. IV Contrast: 80 mL Isovue-370 Oral Contrast: Yes. FINDINGS: LUNG BASES: Clear. LIVER: Normal. GALLBLADDER AND BILIARY TREE: No calcified gallstones. No gallbladder distention. No intra- or extrahepatic biliary dilatation. PANCREAS: Normal. SPLEEN: Normal. ADRENAL GLANDS: Normal. KIDNEYS AND URETERS: No hydronephrosis. Heterogeneously decreased enhancement, decreased density, of the left renal upper pole. Mild ill definition of the cortical margin of the left renal upper pole medially. No perinephric fluid or fluid collection. URINARY BLADDER: Bladder is mildly distended. REPRODUCTIVE ORGANS: IUD present. BOWEL: Oral contrast within stomach, nondistended small bowel loops and right colon. No bowel distention. No bowel wall thickening. Normal appendix. LYMPH NODES: No abnormally enlarged nodes. PERITONEUM/RETROPERITONEUM: No ascites or free air. VESSELS: Aorta is normal caliber and proximal branch vessels are patent. ABDOMINAL WALL: Normal. BONES: No destructive process. 1. Decreased enhancement, decreased density, of the left renal upper pole. Findings would be most suggestive of pyelonephritis. Clinical correlation is necessary. Renal infarct could also be possible etiology. Underlying mass lesion considered less likely but not entirely excluded. Xr Chest Portable    Result Date: 4/28/2021  EXAM: PORTABLE AP CHEST X-RAY, 3:53 PM INDICATION: leukocytosis, COMPARISON: none FINDINGS: HEART / MEDIASTINUM: Normal in size. LUNGS/PLEURA: Lungs are clear.  BONES / SOFT TISSUES: No acute abnormality. OTHER: None. 1. No acute disease. Treatments: As above. Discharge Medications:     Medication List      START taking these medications    ciprofloxacin 500 MG tablet  Commonly known as: CIPRO  Take 1 tablet by mouth 2 times daily for 10 days  Notes to patient: Ciprofloxacin  (Cipro)  USE--treat bacterial infection  SIDE EFFECTS--  Upset stomach, diarrhea, sun sensitivity        CONTINUE taking these medications    doxycycline hyclate 100 MG capsule  Commonly known as: VIBRAMYCIN  Take 1 capsule by mouth 2 times daily for 10 days  Notes to patient: Doxycycline  (Vibramycin)  USE--  Treat bacterial infection  SIDE EFFECTS--upset stomach, headache        STOP taking these medications    metroNIDAZOLE 500 MG tablet  Commonly known as: FLAGYL           Where to Get Your Medications      These medications were sent to [address] [place] [address] [phone]  [address], [facility] [address]    Phone: [phone]   · ciprofloxacin 500 MG tablet  · doxycycline hyclate 100 MG capsule         Activity: activity as tolerated  Diet: No diet orders on file      Disposition: home  Discharged Condition: Stable  Follow Up:   Hollis Gamboa, [facility] [address] [phone]    Schedule an appointment as soon as possible for a visit in 2 months  urology, follow up in 2-3 months        Code status:  Prior         Total time spent on discharge, finalizing medications, referrals and arranging outpatient follow up was more than 45 minutes      Thank you Dr. Orta primary care provider on file. for the opportunity to be involved in this patients care.

## 2021-05-11 ENCOUNTER — CARE COORDINATION (OUTPATIENT)
Dept: CASE MANAGEMENT | Age: 21
End: 2021-05-11

## 2021-05-11 NOTE — CARE COORDINATION
You Patient resolved from the Care Transitions episode on 05/11/2021    Patient/family has been provided the following resources and education related to COVID-19:                         Signs, symptoms and red flags related to COVID-19            CDC exposure and quarantine guidelines            Conduit exposure contact - [phone]            Contact for their local Department of Health                 Patient currently reports that the following symptoms have improved:  no new/worsening symptoms. Patient states she is doing well, no more flank pain, no reports of any nausea, vomiting, fevers, chills, SOB or cough. No other issues or concerns at this time. Patient still on PO ABX's, instructed to take as prescribed, and until completely finished. No further outreach scheduled with this CTN/ACM. Episode of Care resolved. Patient has this CTN/ACM contact information if future needs arise.     Thank Sterling Collins RN  Care Transition Coordinator  Contact [facility]:[phone]

## 2023-06-06 LAB
CHLAMYDIA TRACH., AMPLIFIED: NEGATIVE
N. GONORRHEA, AMPLIFIED: NEGATIVE
TRICHOMONAS VAGINALIS: NEGATIVE

## 2023-07-11 ENCOUNTER — OFFICE VISIT (OUTPATIENT)
Dept: PRIMARY CARE | Facility: CLINIC | Age: 23
End: 2023-07-11
Payer: COMMERCIAL

## 2023-07-11 ENCOUNTER — LAB (OUTPATIENT)
Dept: LAB | Facility: LAB | Age: 23
End: 2023-07-11
Payer: COMMERCIAL

## 2023-07-11 ENCOUNTER — TELEPHONE (OUTPATIENT)
Dept: PRIMARY CARE | Facility: CLINIC | Age: 23
End: 2023-07-11

## 2023-07-11 VITALS
RESPIRATION RATE: 16 BRPM | HEART RATE: 81 BPM | TEMPERATURE: 96 F | SYSTOLIC BLOOD PRESSURE: 120 MMHG | HEIGHT: 67 IN | DIASTOLIC BLOOD PRESSURE: 60 MMHG | BODY MASS INDEX: 18.52 KG/M2 | WEIGHT: 118 LBS | OXYGEN SATURATION: 100 %

## 2023-07-11 DIAGNOSIS — Z00.00 HEALTHCARE MAINTENANCE: Primary | ICD-10-CM

## 2023-07-11 DIAGNOSIS — Z00.00 HEALTHCARE MAINTENANCE: ICD-10-CM

## 2023-07-11 LAB
POC APPEARANCE, URINE: CLEAR
POC BILIRUBIN, URINE: NEGATIVE
POC BLOOD, URINE: NEGATIVE
POC COLOR, URINE: YELLOW
POC GLUCOSE, URINE: NEGATIVE MG/DL
POC KETONES, URINE: NEGATIVE MG/DL
POC LEUKOCYTES, URINE: NEGATIVE
POC NITRITE,URINE: NEGATIVE
POC PH, URINE: 6 PH
POC PROTEIN, URINE: ABNORMAL MG/DL
POC SPECIFIC GRAVITY, URINE: 1.01
POC UROBILINOGEN, URINE: 0.2 EU/DL

## 2023-07-11 PROCEDURE — 86481 TB AG RESPONSE T-CELL SUSP: CPT

## 2023-07-11 PROCEDURE — 36415 COLL VENOUS BLD VENIPUNCTURE: CPT

## 2023-07-11 PROCEDURE — 99385 PREV VISIT NEW AGE 18-39: CPT | Performed by: FAMILY MEDICINE

## 2023-07-11 PROCEDURE — 1036F TOBACCO NON-USER: CPT | Performed by: FAMILY MEDICINE

## 2023-07-11 PROCEDURE — 81002 URINALYSIS NONAUTO W/O SCOPE: CPT | Performed by: FAMILY MEDICINE

## 2023-07-11 NOTE — TELEPHONE ENCOUNTER
Pt reports working out last night. States she could drink more fluids. Will increase her fluids. -OBED Marrero, CMA  7/11/2023  2:32 PM EDT Back to Top      Left message to call back. -OBED Spaulding, DO  7/11/2023  1:57 PM EDT       Urine test had protein in it at her physical - had she just come from a workout or was dehydrated?

## 2023-07-11 NOTE — PATIENT INSTRUCTIONS
Welcome to our practice!    Today we performed your Annual Physical Exam.  Your preventative health care, labs and vaccinations have been reviewed and are up to date.     Your vaccines are up to date.  COVID booster is recommended    TB test ordered for school.  Paperwork completed.     Follow up in 1 year for your Complete Physical Exam

## 2023-07-11 NOTE — PROGRESS NOTES
"Subjective   Patient ID: Swetha Espinoza is a 22 y.o. female who presents for Annual Exam.    HPI   Dentist and Eye Doctor appointments: utd with dentist; no eye doctor  Immunizations: due to finish covid series  Diet: eats fruits and veggies,not much meat. dairy  Exercise: runs at the gym about 3 or 4 days a week  Supplements: None  Menstrual Cycles: No- IUD  Sexually Active: Yes, Male, No std concerns, has an IUD  Pregnancy History: G0  Cancer Screening:    Cervical: 2023 - wnl with obgyn   Breast: Mom (genetic negative)   Colon: N/A   Skin:occasionally sunscreen   DEXA: N/A       Review of Systems    Objective   /60 (BP Location: Left arm, Patient Position: Sitting)   Pulse 81   Temp 35.6 °C (96 °F) (Temporal)   Resp 16   Ht 1.702 m (5' 7\")   Wt 53.5 kg (118 lb)   LMP  (LMP Unknown)   SpO2 100%   BMI 18.48 kg/m²     Physical Exam    Assessment/Plan          "

## 2023-07-14 ENCOUNTER — TELEPHONE (OUTPATIENT)
Dept: PRIMARY CARE | Facility: CLINIC | Age: 23
End: 2023-07-14
Payer: COMMERCIAL

## 2023-07-14 LAB
NIL(NEG) CONTROL SPOT COUNT: NORMAL
PANEL A SPOT COUNT: 0
PANEL B SPOT COUNT: 0
POS CONTROL SPOT COUNT: NORMAL
T-SPOT. TB INTERPRETATION: NEGATIVE

## 2023-07-14 NOTE — TELEPHONE ENCOUNTER
Pt came in to get her form regarding her TB test for school.  Please call her when it is signed and ready for her to .  [phone]

## 2024-02-03 NOTE — PROGRESS NOTES
Aquaphor, keep skin moisturized.      Patient arrived to floor via wheelchair to room 4461. Steady gait on ambulation, non-skid socks given. VSS, denies pain. Temp 99.0 orally. Oriented to room and use of call light, verbalized understanding. Updated on plan of care. Will continue to monitor.   Merari Melida 4/29/2021

## 2024-07-16 ENCOUNTER — TELEPHONE (OUTPATIENT)
Dept: PRIMARY CARE | Facility: CLINIC | Age: 24
End: 2024-07-16
Payer: COMMERCIAL

## 2024-07-16 NOTE — TELEPHONE ENCOUNTER
Pt called and would like updated immunization records printed out pt stated she will come in the office later today to

## 2024-08-14 ENCOUNTER — APPOINTMENT (OUTPATIENT)
Dept: OBSTETRICS AND GYNECOLOGY | Facility: CLINIC | Age: 24
End: 2024-08-14
Payer: COMMERCIAL

## 2024-08-14 VITALS
SYSTOLIC BLOOD PRESSURE: 110 MMHG | DIASTOLIC BLOOD PRESSURE: 70 MMHG | WEIGHT: 114 LBS | BODY MASS INDEX: 17.89 KG/M2 | HEIGHT: 67 IN

## 2024-08-14 DIAGNOSIS — Z11.3 ROUTINE SCREENING FOR STI (SEXUALLY TRANSMITTED INFECTION): ICD-10-CM

## 2024-08-14 DIAGNOSIS — Z30.431 IUD CHECK UP: Primary | ICD-10-CM

## 2024-08-14 PROCEDURE — 87661 TRICHOMONAS VAGINALIS AMPLIF: CPT

## 2024-08-14 PROCEDURE — 1036F TOBACCO NON-USER: CPT | Performed by: ADVANCED PRACTICE MIDWIFE

## 2024-08-14 PROCEDURE — 87591 N.GONORRHOEAE DNA AMP PROB: CPT

## 2024-08-14 PROCEDURE — 3008F BODY MASS INDEX DOCD: CPT | Performed by: ADVANCED PRACTICE MIDWIFE

## 2024-08-14 PROCEDURE — 87491 CHLMYD TRACH DNA AMP PROBE: CPT

## 2024-08-14 PROCEDURE — 99213 OFFICE O/P EST LOW 20 MIN: CPT | Performed by: ADVANCED PRACTICE MIDWIFE

## 2024-08-14 ASSESSMENT — PAIN SCALES - GENERAL: PAINLEVEL: 0-NO PAIN

## 2024-08-14 NOTE — PROGRESS NOTES
IUD check     Chaperone declined: Macarena Resendiz, CM3      Subjective   Patient ID: Swetha Espinoza is a 24 y.o. female who presents for No chief complaint on file..  HPI    Pt. Here for IUD check    Mirena inserted 11/2020 in [place]  Occasional spotting  Happy with method     Last sexual intercourse in May 2024, no condom   Would like STI screening     Moving to [place] tomorrow!         Review of Systems    Objective   Physical Exam  Constitutional:       Appearance: Normal appearance. She is normal weight.   Genitourinary:     General: Normal vulva.      Vagina: Normal.      Cervix: Normal.      Comments: IUD strings visible   Neurological:      Mental Status: She is alert.   Psychiatric:         Mood and Affect: Mood normal.         Behavior: Behavior normal.         Thought Content: Thought content normal.         Judgment: Judgment normal.         Assessment/Plan   Problem List Items Addressed This Visit             ICD-10-CM       Medium    IUD check up - Primary Z30.431    Routine screening for STI (sexually transmitted infection) Z11.3    Relevant Orders    C. Trachomatis / N. Gonorrhoeae, Amplified Detection    Trichomonas vaginalis, Amplified            ELI Paz 08/14/24 2:07 PM

## 2024-08-15 LAB
C TRACH RRNA SPEC QL NAA+PROBE: NEGATIVE
N GONORRHOEA DNA SPEC QL PROBE+SIG AMP: NEGATIVE
T VAGINALIS RRNA SPEC QL NAA+PROBE: NEGATIVE